# Patient Record
Sex: FEMALE | Race: BLACK OR AFRICAN AMERICAN | NOT HISPANIC OR LATINO | URBAN - METROPOLITAN AREA
[De-identification: names, ages, dates, MRNs, and addresses within clinical notes are randomized per-mention and may not be internally consistent; named-entity substitution may affect disease eponyms.]

---

## 2023-02-06 ENCOUNTER — OFFICE VISIT (OUTPATIENT)
Dept: FAMILY MEDICINE | Facility: CLINIC | Age: 38
End: 2023-02-06
Payer: COMMERCIAL

## 2023-02-06 VITALS
SYSTOLIC BLOOD PRESSURE: 126 MMHG | DIASTOLIC BLOOD PRESSURE: 88 MMHG | TEMPERATURE: 98.8 F | OXYGEN SATURATION: 100 % | HEART RATE: 82 BPM

## 2023-02-06 DIAGNOSIS — A08.4 VIRAL GASTROENTERITIS: Primary | ICD-10-CM

## 2023-02-06 PROCEDURE — 99203 OFFICE O/P NEW LOW 30 MIN: CPT

## 2023-02-06 ASSESSMENT — ENCOUNTER SYMPTOMS
WEAKNESS: 0
CHANGE IN BOWEL HABIT: 1
DIARRHEA: 1
FEVER: 0
ABDOMINAL PAIN: 1
SORE THROAT: 1
VOMITING: 0
NAUSEA: 0
MYALGIAS: 0
HEADACHES: 1

## 2023-02-06 NOTE — LETTER
February 6, 2023      Amairani Nava  [address] [facility] [address]        To Whom It May Concern:    Amairani Nava  was seen on 2/6/23.  Please excuse her until until improvement of symptoms due to illness.        Sincerely,        [facility]

## 2023-02-06 NOTE — PROGRESS NOTES
Assessment & Plan       ICD-10-CM    1. Viral gastroenteritis  A08.4            Patient instructions:    Patient Instructions   Increase fluids with water, Pedialyte, Gatorade, or rehydrating beverages. Alternate Tylenol and Ibuprofen as needed for aches, pains or fever. Follow clear liquid to BRAT diet (bananas, rice, applesauce, toast) as needed for any upset stomach. Rest as much as possible. Follow up clinic if symptoms persist or worsen or you show signs of dehydration including decreased urination, lack of tears, dry mouth.       Medical decision making:  Discussed possible lab testing such as influenza, covid and strep. As her sick contact tested negative for these, she will hold off at this time. Discussed concerned for abdominal pain and if any new or worsening symptoms, to return or go to ED.     Return if symptoms worsen or fail to improve, for Follow up.    At the end of the encounter, I discussed results, diagnosis, medications. Discussed red flags for immediate return to clinic/ER, as well as indications for follow up if no improvement. Patient understood and agreed to plan. Patient was stable for discharge.    Subjective     Amairani is a 38 year old female who presents to clinic today for the following health issues:  Chief Complaint   Patient presents with     Diarrhea     Ongoing x 2 days, abdominal cramping, HA      38-year-old female presents with reports of diarrhea for 2 days as well as cramping and headache.  She also reports a sore throat.  Amairani reports her coworker is sick and now several workers are also sick with similar symptoms.  She reports anything she eats or drinks she poops out quickly.  She reports normal urine habits.  She denies nausea or vomiting.    Diarrhea  This is a new problem. The current episode started yesterday. The problem occurs constantly. The problem has been unchanged. Associated symptoms include abdominal pain, a change in bowel habit, headaches and a sore throat.  Pertinent negatives include no fever, myalgias, nausea, urinary symptoms, vomiting or weakness. The symptoms are aggravated by eating. She has tried nothing for the symptoms.           Review of Systems   Constitutional: Negative for fever.   HENT: Positive for sore throat.    Gastrointestinal: Positive for abdominal pain, change in bowel habit and diarrhea. Negative for nausea and vomiting.   Musculoskeletal: Negative for myalgias.   Neurological: Positive for headaches. Negative for weakness.       Problem List:  There are no relevant problems documented for this patient.      No past medical history on file.    Social History     Tobacco Use     Smoking status: Never     Smokeless tobacco: Never   Substance Use Topics     Alcohol use: Not Currently           Objective    /88   Pulse 82   Temp 98.8  F (37.1  C) (Tympanic)   LMP 01/23/2023 (Approximate)   SpO2 100%   Physical Exam  Constitutional:       Appearance: Normal appearance.   HENT:      Head: Normocephalic and atraumatic.      Mouth/Throat:      Pharynx: No oropharyngeal exudate or posterior oropharyngeal erythema.   Eyes:      Conjunctiva/sclera: Conjunctivae normal.      Pupils: Pupils are equal, round, and reactive to light.   Cardiovascular:      Rate and Rhythm: Normal rate and regular rhythm.      Heart sounds: Normal heart sounds.   Pulmonary:      Effort: Pulmonary effort is normal.      Breath sounds: Normal breath sounds.   Abdominal:      General: Abdomen is flat. Bowel sounds are normal.      Palpations: Abdomen is soft.      Tenderness: There is abdominal tenderness in the left upper quadrant and left lower quadrant. Negative signs include Montenegro's sign and McBurney's sign.   Musculoskeletal:      Cervical back: Normal range of motion and neck supple.   Skin:     General: Skin is warm and dry.   Neurological:      General: No focal deficit present.      Mental Status: She is alert and oriented to person, place, and time.    Psychiatric:         Mood and Affect: Mood normal.         Behavior: Behavior normal.         Thought Content: Thought content normal.         Judgment: Judgment normal.              Jhonny Engle PA-C

## 2023-02-13 ENCOUNTER — OFFICE VISIT (OUTPATIENT)
Dept: FAMILY MEDICINE | Facility: CLINIC | Age: 38
End: 2023-02-13
Payer: COMMERCIAL

## 2023-02-13 VITALS
WEIGHT: 192 LBS | HEART RATE: 68 BPM | RESPIRATION RATE: 14 BRPM | DIASTOLIC BLOOD PRESSURE: 77 MMHG | OXYGEN SATURATION: 99 % | SYSTOLIC BLOOD PRESSURE: 128 MMHG | TEMPERATURE: 98.2 F

## 2023-02-13 DIAGNOSIS — Z87.19 HISTORY OF GASTROENTERITIS: ICD-10-CM

## 2023-02-13 DIAGNOSIS — Z02.89 ENCOUNTER FOR COMPLETION OF FORM WITH PATIENT: Primary | ICD-10-CM

## 2023-02-13 PROCEDURE — 99212 OFFICE O/P EST SF 10 MIN: CPT

## 2023-02-13 ASSESSMENT — ENCOUNTER SYMPTOMS
CONSTITUTIONAL NEGATIVE: 1
GASTROINTESTINAL NEGATIVE: 1

## 2023-02-13 NOTE — PROGRESS NOTES
Assessment & Plan       ICD-10-CM    1. Encounter for completion of form with patient  Z02.89       2. History of gastroenteritis  Z87.19          Amairani has improved, note written to return to work.     Return if symptoms worsen or fail to improve, for Follow up.    At the end of the encounter, I discussed results, diagnosis, medications. Discussed red flags for immediate return to clinic/ER, as well as indications for follow up if no improvement. Patient understood and agreed to plan. Patient was stable for discharge.    Subjective     Amairani is a 38 year old female who presents to clinic today for the following health issues:  Chief Complaint   Patient presents with     Urgent Care     Need rtw note was sick a week ago and was seen here      Amairani presents needing a return to work form. She was sick last week with norovirus. She has improved.           Review of Systems   Constitutional: Negative.    Gastrointestinal: Negative.    Genitourinary: Negative.        Problem List:  There are no relevant problems documented for this patient.      History reviewed. No pertinent past medical history.    Social History     Tobacco Use     Smoking status: Never     Smokeless tobacco: Never   Substance Use Topics     Alcohol use: Not Currently           Objective    /77   Pulse 68   Temp 98.2  F (36.8  C) (Oral)   Resp 14   Wt 87.1 kg (192 lb)   LMP 01/23/2023 (Approximate)   SpO2 99%   Physical Exam  Constitutional:       Appearance: Normal appearance.   HENT:      Head: Normocephalic and atraumatic.   Musculoskeletal:      Cervical back: Normal range of motion and neck supple.   Skin:     General: Skin is warm and dry.   Neurological:      General: No focal deficit present.      Mental Status: She is alert and oriented to person, place, and time.   Psychiatric:         Mood and Affect: Mood normal.         Behavior: Behavior normal.         Thought Content: Thought content normal.         Judgment: Judgment  normal.              Jhonny Engle PA-C

## 2023-02-13 NOTE — LETTER
February 13, 2023      Amairani Nava  [address] [facility] [address]        To Whom It May Concern:    Amairani Nava  was seen on 2/6/23.  Please excuse her until 2/13/23 due to illness. She may return 2/13/23.        Sincerely,      [facility]

## 2023-03-10 ENCOUNTER — OFFICE VISIT (OUTPATIENT)
Dept: FAMILY MEDICINE | Facility: CLINIC | Age: 38
End: 2023-03-10
Payer: COMMERCIAL

## 2023-03-10 VITALS
DIASTOLIC BLOOD PRESSURE: 76 MMHG | SYSTOLIC BLOOD PRESSURE: 118 MMHG | OXYGEN SATURATION: 100 % | HEART RATE: 84 BPM | TEMPERATURE: 98.9 F

## 2023-03-10 DIAGNOSIS — A08.4 VIRAL GASTROENTERITIS: Primary | ICD-10-CM

## 2023-03-10 PROCEDURE — 99213 OFFICE O/P EST LOW 20 MIN: CPT

## 2023-03-10 RX ORDER — ONDANSETRON 4 MG/1
TABLET, ORALLY DISINTEGRATING ORAL
COMMUNITY
Start: 2023-01-19

## 2023-03-10 RX ORDER — AMITRIPTYLINE HYDROCHLORIDE 10 MG/1
TABLET ORAL
COMMUNITY
Start: 2022-11-29

## 2023-03-10 RX ORDER — SUMATRIPTAN 100 MG/1
TABLET, FILM COATED ORAL
COMMUNITY
Start: 2022-11-29

## 2023-03-10 ASSESSMENT — ENCOUNTER SYMPTOMS
NAUSEA: 1
CARDIOVASCULAR NEGATIVE: 1
DIARRHEA: 1
ABDOMINAL PAIN: 1
CHILLS: 1
HEADACHES: 1
RESPIRATORY NEGATIVE: 1
APPETITE CHANGE: 1
VOMITING: 1
FEVER: 0

## 2023-03-10 NOTE — PROGRESS NOTES
Assessment & Plan     Viral gastroenteritis  Exposed by close friend who has similar symptoms.  Unfortunately her illness is exacerbating her migraine.  Offered shot of Toradol which she declined.  Discussed clear liquid diet, using Zofran she has at home and then tried her Imitrex again.      15 minutes spent on the date of the encounter doing chart review, patient visit and documentation        See Patient Instructions    Return in about 1 week (around 3/17/2023), or if symptoms worsen or fail to improve.    [facility]    Javier Blaes is a 38 year old presenting for the following health issues:  Urgent Care and Headache (Started this morning with HA and diarrhea at 3am. Pt unable keep anything down)      HPI     Presents with nausea vomiting and diarrhea that started this morning.  Has had 5-6 watery stools with no blood.  Has cramping before stools.  No fever but has some chills and sweats.  Complains of a headache.  Thought it was her migraines so took an Imitrex but she threw that up.  Has not used her Zofran she has at home yet.  Was at a friend's house 2 days ago who now has similar symptoms as well.    Review of Systems   Constitutional: Positive for appetite change and chills. Negative for fever.   HENT: Negative.    Respiratory: Negative.    Cardiovascular: Negative.    Gastrointestinal: Positive for abdominal pain, diarrhea, nausea and vomiting.   Genitourinary: Negative.    Neurological: Positive for headaches.            Objective    /76   Pulse 84   Temp 98.9  F (37.2  C) (Tympanic)   LMP 02/20/2023 (Approximate)   SpO2 100%   There is no height or weight on file to calculate BMI.  Physical Exam   GENERAL: healthy, alert and no distress  EYES: Eyes grossly normal to inspection, PERRL and conjunctivae and sclerae normal  HENT: ear canals and TM's normal, nose and mouth without ulcers or  lesions  NECK: no adenopathy, no asymmetry, masses, or scars and thyroid normal to palpation  RESP: lungs clear to auscultation - no rales, rhonchi or wheezes  CV: regular rates and rhythm, normal S1 S2, no S3 or S4 and no murmur, click or rub  ABDOMEN: soft, nontender, without hepatosplenomegaly or masses and bowel sounds normal

## 2023-03-10 NOTE — LETTER
March 10, 2023      Amairani Nava  [address]  [place] [address]        To Whom It May Concern:    Amairani Nava  was seen on 3/10/23.  Please excuse her from work missed due to illness. She can return to work on 3/13/23        Sincerely,    Cheryl Colon CNP    [facility]